# Patient Record
Sex: FEMALE | Race: WHITE | NOT HISPANIC OR LATINO | Employment: FULL TIME | URBAN - METROPOLITAN AREA
[De-identification: names, ages, dates, MRNs, and addresses within clinical notes are randomized per-mention and may not be internally consistent; named-entity substitution may affect disease eponyms.]

---

## 2024-06-24 ENCOUNTER — OFFICE VISIT (OUTPATIENT)
Dept: URGENT CARE | Facility: CLINIC | Age: 59
End: 2024-06-24
Payer: COMMERCIAL

## 2024-06-24 VITALS
OXYGEN SATURATION: 96 % | RESPIRATION RATE: 19 BRPM | SYSTOLIC BLOOD PRESSURE: 122 MMHG | TEMPERATURE: 98 F | WEIGHT: 218.5 LBS | BODY MASS INDEX: 40.21 KG/M2 | HEIGHT: 62 IN | DIASTOLIC BLOOD PRESSURE: 82 MMHG | HEART RATE: 72 BPM

## 2024-06-24 DIAGNOSIS — S49.92XA INJURY OF LEFT SHOULDER, INITIAL ENCOUNTER: ICD-10-CM
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
## 2024-06-24 DIAGNOSIS — S29.9XXA RIB INJURY: ICD-10-CM

## 2024-06-24 DIAGNOSIS — S89.92XA INJURY OF LEFT KNEE, INITIAL ENCOUNTER: ICD-10-CM

## 2024-06-24 PROCEDURE — 99204 OFFICE O/P NEW MOD 45 MIN: CPT | Mod: S$GLB,,,

## 2024-06-24 PROCEDURE — 73030 X-RAY EXAM OF SHOULDER: CPT | Mod: LT,S$GLB,, | Performed by: RADIOLOGY

## 2024-06-24 PROCEDURE — 71101 X-RAY EXAM UNILAT RIBS/CHEST: CPT | Mod: LT,S$GLB,, | Performed by: RADIOLOGY

## 2024-06-24 PROCEDURE — 73562 X-RAY EXAM OF KNEE 3: CPT | Mod: LT,S$GLB,, | Performed by: RADIOLOGY

## 2024-06-24 RX ORDER — NADOLOL 80 MG/1
80 TABLET ORAL
COMMUNITY
Start: 2024-06-10

## 2024-06-24 RX ORDER — TIRZEPATIDE 5 MG/.5ML
INJECTION, SOLUTION SUBCUTANEOUS
COMMUNITY
Start: 2024-06-13

## 2024-06-24 RX ORDER — SIMVASTATIN 40 MG/1
40 TABLET, FILM COATED ORAL NIGHTLY
COMMUNITY
Start: 2024-06-24

## 2024-06-24 RX ORDER — SPIRONOLACTONE 50 MG/1
50 TABLET, FILM COATED ORAL
COMMUNITY
Start: 2024-06-10

## 2024-06-24 RX ORDER — EMPAGLIFLOZIN 25 MG/1
25 TABLET, FILM COATED ORAL
COMMUNITY
Start: 2024-06-24

## 2024-06-24 RX ORDER — CLOBETASOL PROPIONATE 0.5 MG/G
1 OINTMENT TOPICAL 2 TIMES DAILY
COMMUNITY
Start: 2024-02-13

## 2024-06-24 RX ORDER — ESOMEPRAZOLE MAGNESIUM 40 MG/1
40 CAPSULE, DELAYED RELEASE ORAL
COMMUNITY
Start: 2024-06-15

## 2024-06-24 RX ORDER — ACETAMINOPHEN 500 MG
1000 TABLET ORAL
Status: COMPLETED | OUTPATIENT
Start: 2024-06-24 | End: 2024-06-24

## 2024-06-24 RX ORDER — LIDOCAINE 50 MG/G
1 PATCH TOPICAL DAILY
Qty: 9 PATCH | Refills: 0 | Status: SHIPPED | OUTPATIENT
Start: 2024-06-24

## 2024-06-24 RX ORDER — URSODIOL 300 MG/1
300 CAPSULE ORAL 2 TIMES DAILY
COMMUNITY
Start: 2024-06-18

## 2024-06-24 RX ORDER — DICYCLOMINE HYDROCHLORIDE 20 MG/1
20 TABLET ORAL 2 TIMES DAILY
COMMUNITY
Start: 2024-06-18

## 2024-06-24 RX ORDER — FREMANEZUMAB-VFRM 225 MG/1.5ML
INJECTION SUBCUTANEOUS
COMMUNITY
Start: 2024-01-29

## 2024-06-24 RX ADMIN — Medication 1000 MG: at 01:06

## 2024-06-24 NOTE — PATIENT INSTRUCTIONS
Rest.   Apply cold compresses intermittently as needed.    Avoid activities that cause pain.   As pain recedes, begin normal activities slowly as tolerated.    Practice deep breathing exercises to avoid pneumonia.     Apply a lidocaine patch to most painful area x 12 hours.     Alternate tylenol and ibuprofen every 4 hours as needed for pain. Always take ibuprofen with food and water to avoid GI upset. Stop taking if you develop abdominal pain or blood in stool.     Follow up with your PCP in a few days when you return home.     Go to the ER If you have shortness of breath, worsening chest pain, palpitations, head injury with loss of consciousness, dizziness/lightheadedness.

## 2024-06-24 NOTE — PROGRESS NOTES
"Subjective:      Patient ID: Bryan Cristina is a 59 y.o. female.    Vitals:  height is 5' 2" (1.575 m) and weight is 99.1 kg (218 lb 8 oz). Her oral temperature is 98 °F (36.7 °C). Her blood pressure is 122/82 and her pulse is 72. Her respiration is 19 and oxygen saturation is 96%.     Chief Complaint: Rib Injury    Pt is [place] from out of state; returns home tomorrow morning. Patient reports fall on concrete about an hour ago. She reports hx of arthritis in knees and left leg gave out. No dizziness/lightheadedness prior to fall. Patient hurt her left knee, left shoulder, and left rib area. Hx of fractured ribs from falls in the past. No head injury, LOC or blood thinner use.     Fall  The accident occurred Less than 1 hour ago. The fall occurred while walking. She landed on [place]. The volume of blood lost was minimal. The point of impact was the left shoulder and left knee. The pain is at a severity of 10/10. The pain is severe. Pertinent negatives include no fever, headaches or loss of consciousness. She has tried nothing for the symptoms.       Constitution: Negative for chills, fatigue and fever.   Musculoskeletal:  Positive for pain, trauma and joint pain. Negative for joint swelling, abnormal ROM of joint and back pain.   Skin:  Positive for abrasion.   Neurological:  Negative for dizziness, light-headedness, passing out, headaches, altered mental status and loss of consciousness.   Psychiatric/Behavioral:  Negative for altered mental status.       Objective:     Physical Exam   Constitutional: She is oriented to person, place, and time. She appears well-developed. She is cooperative. No distress.   HENT:   Head: Normocephalic and atraumatic.   Nose: Nose normal.   Mouth/Throat: Oropharynx is clear and moist and mucous membranes are normal.   Eyes: Conjunctivae and lids are normal.   Neck: Trachea normal and phonation normal. Neck supple.   Cardiovascular: Normal rate, regular rhythm, normal heart " sounds and normal pulses.   Pulmonary/Chest: Effort normal and breath sounds normal.       Abdominal: Normal appearance and bowel sounds are normal. She exhibits no mass. Soft.   Musculoskeletal:         General: No deformity.      Left shoulder: She exhibits tenderness. She exhibits normal range of motion, no swelling and normal pulse.      Left knee: She exhibits normal range of motion, no swelling, no effusion, no deformity and no laceration (abrasion). Tenderness found.   Neurological: She is alert and oriented to person, place, and time. She has normal strength and normal reflexes. No sensory deficit.   Skin: Skin is warm, dry, intact and not diaphoretic. not left knee (abrasion)  Psychiatric: Her speech is normal and behavior is normal. Judgment and thought content normal.   Nursing note and vitals reviewed.      Assessment:     1. Fall, initial encounter    2. Rib injury    3. Injury of left shoulder, initial encounter    4. Injury of left knee, initial encounter        Plan:       Fall, initial encounter  -     XR KNEE 3 VIEW LEFT; Future; Expected date: 06/24/2024  -     X-Ray Shoulder 2 or More Views Left; Future; Expected date: 06/24/2024    Rib injury  -     XR RIB LEFT W/ PA CHEST; Future; Expected date: 06/24/2024  -     acetaminophen tablet 1,000 mg  -     LIDOcaine (LIDODERM) 5 %; Place 1 patch onto the skin once daily. Remove & Discard patch within 12 hours or as directed by MD  Dispense: 9 patch; Refill: 0    Injury of left shoulder, initial encounter  -     X-Ray Shoulder 2 or More Views Left; Future; Expected date: 06/24/2024    Injury of left knee, initial encounter  -     XR KNEE 3 VIEW LEFT; Future; Expected date: 06/24/2024  -     BANDAGE E[employer]IC 6IN ACE      X-rays (shoulder, ribs and knee) are with negative results. Advised RICE. Lidocaine patch to most painful rib area. PCP told her to use ibuprofen/tylenol sparingly. Has allergies to some muscle relaxers and hesitant to take today because  she flies out home early in the morning. Advised to follow up with PCP when she returns home.         Discussed results/diagnosis/plan with patient in clinic. Strict precautions given to patient to monitor for worsening signs and symptoms. Advised to follow up with PCP or specialist.  Explained side effects of medications prescribed with patient and informed him/her to discontinue use if he/she has any side effects and to inform UC or PCP if this occurs. All questions answered. Strict ED verses clinic return precautions stressed and given in depth. Advised if symptoms worsens of fail to improve he/she should go to the Emergency Room. Discharge and follow-up instructions given verbally/printed with the patient who expressed understanding and willingness to comply with my recommendations. Patient voiced understanding and in agreement with current treatment plan. Patient exits the exam room in no acute distress. Conversant and engaged during discharge discussion, verbalized understanding.